# Patient Record
Sex: FEMALE | Race: WHITE | NOT HISPANIC OR LATINO | Employment: OTHER | URBAN - METROPOLITAN AREA
[De-identification: names, ages, dates, MRNs, and addresses within clinical notes are randomized per-mention and may not be internally consistent; named-entity substitution may affect disease eponyms.]

---

## 2018-07-05 ENCOUNTER — OFFICE VISIT (OUTPATIENT)
Dept: UROLOGY | Facility: CLINIC | Age: 78
End: 2018-07-05
Attending: OBSTETRICS & GYNECOLOGY
Payer: MEDICARE

## 2018-07-05 VITALS
SYSTOLIC BLOOD PRESSURE: 154 MMHG | HEART RATE: 74 BPM | WEIGHT: 134.3 LBS | DIASTOLIC BLOOD PRESSURE: 80 MMHG | BODY MASS INDEX: 22.01 KG/M2

## 2018-07-05 DIAGNOSIS — N81.6 RECTOCELE: Primary | ICD-10-CM

## 2018-07-05 PROCEDURE — G0463 HOSPITAL OUTPT CLINIC VISIT: HCPCS | Mod: ZF

## 2018-07-05 RX ORDER — ESTRADIOL 0.1 MG/G
1 CREAM VAGINAL
Qty: 42.5 G | Refills: 3 | Status: SHIPPED | OUTPATIENT
Start: 2018-07-05

## 2018-07-05 RX ORDER — LORATADINE 10 MG/1
10 CAPSULE, LIQUID FILLED ORAL PRN
COMMUNITY

## 2018-07-05 RX ORDER — FOLIC ACID 1 MG/1
1 TABLET ORAL DAILY
Refills: 3 | COMMUNITY
Start: 2017-10-02

## 2018-07-05 RX ORDER — LANOLIN ALCOHOL/MO/W.PET/CERES
1000 CREAM (GRAM) TOPICAL DAILY
COMMUNITY
Start: 2016-09-01

## 2018-07-05 RX ORDER — DIPHENOXYLATE HYDROCHLORIDE AND ATROPINE SULFATE 2.5; .025 MG/1; MG/1
1 TABLET ORAL DAILY
COMMUNITY

## 2018-07-05 RX ORDER — FERROUS SULFATE 325(65) MG
325 TABLET, DELAYED RELEASE (ENTERIC COATED) ORAL DAILY
COMMUNITY
Start: 2017-03-23

## 2018-07-05 RX ORDER — PILOCARPINE HYDROCHLORIDE 5 MG/1
5 TABLET, FILM COATED ORAL 2 TIMES DAILY
COMMUNITY

## 2018-07-05 ASSESSMENT — PAIN SCALES - GENERAL: PAINLEVEL: SEVERE PAIN (7)

## 2018-07-05 NOTE — LETTER
"7/5/2018     RE: Alejandra Ely  [address]  [place] [address]     Dear Colleague,    Thank you for referring your patient, Alejandra Ely, to the [facility] at [facility]. Please see a copy of my visit note below.    July 5, 2018    Referring Provider: Alma Brewer  [facility]  [address] [place] [address]    Primary Care Provider: Gaviota Whittaker    CC: Vaginal Bulge & Pelvic Pressure    HPI:  Alejandra Ely is a 77 year old female w/ PMH of chronic constipation, Lupus, Sjogren's, and recently treated UTIs who presents for evaluation of her pelvic floor symptoms. She was referred to the clinic by Alma Brewer NP after being diagnosed with a rectocele. Patient reports a long history of her urinary symptoms, constipation, and vaginal bulge but is uncertain when they first started as she \"doesn't pay much attention to it.\" In regards to her urinary symptoms, she reports having urge incontinence she often dribbles in her pants (a minipad's worth) before making it to the restroom. She's also had a few episodes where she completely wetted herself from her inability to cease urination. She also endorses urinary retention by reporting the urge to urinate post voiding. She's had increasing nocturia and urinary frequency over the past year. Currently, she urinates 4 times per night. She denies leakage with cough/sneezes. She has urinary hesitancy but reports it seems to be improving in the past two weeks. She is uncertain of hematuria.     In regards to her vaginal bulge, she reports that the bulge was previously \"hanging out\" when seen in the ED on 6/24/18. However, it has since regressed back into her vagina. It still bothers her as she can feel the bulge when she's walking, standing, or sitting. The bulge often requires stenting in order for her to defecate. Her constipation has improved since increasing her Miralax dosage from 6/24/18. "       Prolapse:  Do you feel a vaginal bulge? Yes                                      Pressure? Yes with pelvic pressure when sitting   Do you have to place your fingers in the vagina or in the rectum to have a bowel movement? Yes  Impact to quality of life? Severe     Stress Incontinence:  Do you leak urine with cough, sneeze, exercise? No    Urge Incontinence:  Do you often get sudden urges to urinate? No  If so, do you leak with these urges? Yes  How much do you usually leak? Varies, drops to soak  Impact to quality of life?     Voids/day:4-6  Nocturia: 4  Fluid intake: 6 (8 oz)   Caffeine: 1 (8 oz)     Urinating:  Difficulty starting urination or strain to void? Yes  Weak or intermittent stream? Uncertain  Incomplete emptying or dribbling? Yes  Pain or burning with urination? No  Any blood in your urine? Uncertain, can't see    GI:  Constipation? Yes  Frequency stools: every other day  Straining for stools Yes, improving with miralax increased  Stool consistency normal  Ever leak stool (Accidental Bowel Leakage)? No  Need to push the vaginal bulge inside with fingers for defecation    Sexual/Pain:  Are you currently having sex?. No    Prior therapy:  Ever done pelvic floor physical therapy? No  Trial of medication? No  Have you ever tried a pessary? No    Medical History:  Do you have?   High Cholesterol? No  Diabetes? No  High Blood pressure? No   Recurrent UTIs? No  Sleep Apnea? No  Other medical problems: see below    Surgical History:    Hysterectomy? No   Bladder Surgery? No   Other? See below    OB/Gyn History:  Pregnancies? 4  Deliveries? 4  Vaginal 4  Section 0      Medications/Vitamins/Supplements: see below    Drug Allergies: see below    Latex Allergy: No  Iodine Allergy No      Social History:  Marital status:   Do you/ have you ever smoke(d)  cigarettes? No  Drink more than 1 alcoholic beverage a day?  No  Occupation? retired    In the past 3 months have you regularly  experienced:  Chest pain w/ walking/exercise? No                Unusual headaches? No  Leg pain w/ walking/exercise? No                    Easy bruising? No  Difficulty breathing w/ walking/exercise? no  Problems with vision? Yes  Dizziness, falls, or fainting? Yes  Excessive bleeding from cuts, gums, surgery? no  Other:     Past Medical History:   Diagnosis Date     AAA (abdominal aortic aneurysm) (H)      Aortic insufficiency      Aortic stenosis      Barretts esophagus 5/09    EGD - Oozing S/P Cautery     Blood loss anemia      Blood transfusion      Cataract     Bilateral     Coronary artery disease      GERD (gastroesophageal reflux disease)      Macular degeneration disease      Osteopenia 2009     Overflow incontinence      Shortness of breath      Sjogren syndrome      Steroid long-term use        Past Surgical History:   Procedure Laterality Date     C APPENDECTOMY       D & C  2002     EYE SURGERY      bilat     REPLACE VALVE AORTIC  1/31/2012    Procedure:REPLACE VALVE AORTIC; Median Sternotomy, Aortic Valve Replacement;, Intraoperative Transesophageal Echocardiogram, On Pump Oxygenator; Surgeon:CARLTON YANG; Location:[facility]     TONSILLECTOMY         Social History     Social History     Marital status:      Spouse name: N/A     Number of children: N/A     Years of education: N/A     Occupational History     Not on file.     Social History Main Topics     Smoking status: Never Smoker     Smokeless tobacco: Never Used     Alcohol use No      Comment: 1 - 2 times per month     Drug use: No     Sexual activity: Yes     Partners: Male     Other Topics Concern      Service No     Blood Transfusions Yes     Caffeine Concern No     Occupational Exposure No     Hobby Hazards No     Sleep Concern No     Stress Concern No     Weight Concern Yes     Special Diet No     Back Care No     Exercise Yes     Bike Helmet No     Seat Belt Yes     Self-Exams No     Social History Narrative     No  "narrative on file       Family History   Problem Relation Age of Onset     Respiratory Father      emphysema     HEART DISEASE Father      CHF     Hypertension Father      Asthma Son      Cancer - colorectal Brother      april 2010       ROS    Allergies   Allergen Reactions     Dye [Contrast Dye]      headaches     Aspirin      Sensitivity, hx of GI bleeding while on IBU     Clindamycin      \"black stools\", hx of GI bleed while on IBU and Clindamycin     Ibuprofen      Sensitivity, hx of GI bleeding while taking IBU     Levaquin      Sore ankle, tendon     Penicillins Hives     Tramadol Nausea       Current Outpatient Prescriptions   Medication     acetaminophen (TYLENOL) 325 MG tablet     AVASTIN 100 MG/4ML IV SOLN     Calcium (CALCIUM-CARB 600) 600 MG tablet     fish oil-omega-3 fatty acids 1000 MG capsule     ketotifen (ZADITOR) 0.025 % SOLN     MAGNESIUM PO     omeprazole (PRILOSEC) 40 MG capsule     polyethylene glycol (MIRALAX) powder     polyethylene glycol - propylene glycol (SYSTANE) 0.4-0.3 % SOLN     PredniSONE 2 MG TBEC     PRESERVISION AREDS PO     White Petrolatum-Mineral Oil (SYSTANE NIGHTTIME) OINT     No current facility-administered medications for this visit.        /80  Pulse 74  Wt 60.9 kg (134 lb 4.8 oz)  BMI 22.01 kg/m2 No LMP recorded. Patient is postmenopausal. Body mass index is 22.01 kg/(m^2).  Patient is alert, comfortable in no acute distress, non-labored breathing.   Abdomen is soft, non-tender, non-distended, no CVAT.    Atrophic external female genitalia. The urethra was covered by atrophic labia minora.    She has a rectocele to the HR, but otherwise good support on supine strain.  Speculum and bimanual exam are remarkable for significant vaginal atrophy.        POPQ  Aa -3   Ba -3  C -8  D -10  GH 2  PB 3  TVL 10  Ap -1  Bp -1    NEGATIVE SST  No spontaneous VOID; Bladder scan indicated 248mL  Straight catheter volume: 180mL  Post-straight catheter: 68mL left    A/P: Alejandra " GODFREY Ely, 77 year old F with recent hx of recovered UTI and chronic urge incontinence is here for rectocele evaluation and treatment.    We discussed the diagnosis and treatment options. Pt has stage 1 rectocele. Treatment options to include:  1) observation with f/u in 6 -12 months  2) pessary fitting after topical vaginal estrogen treatment  3) surgical management    Will attempt to treat vaginal atrophy with estrogen cream. Will bring pt back for pessary fitting after 1 month of vaginal estrogen.    A total of 60 minutes were spent with the patient today, > 50% in counseling and coordination of care       Lacey Rahman MD  N OBGYN PGY-1  7/5/2018    CC  Patient Care Team:  Gaviota Whittaker MD as PCP - General  Alma Keating as Referring Physician  Kyle Sandoval MD as MD (OB/Gyn)  ALMA KEATING    I was present for the history and physical and agree with the findings and treatment plan outlined above.     Kyle Sandoval MD  Professor, OB/GYN  Urogynecologist

## 2018-07-05 NOTE — NURSING NOTE
Patient tested negative for urianry stress incontinence  Unable to void-  Bladder scan  Showed 240 ml residual urine-simple catheter was performed and 180 ml was obtained.

## 2018-07-05 NOTE — PROGRESS NOTES
"July 5, 2018    Referring Provider: Alma Brewer  [facility]  [address] [place] [address]    Primary Care Provider: Gaviota Whittaker    CC: Vaginal Bulge & Pelvic Pressure    HPI:  Alejandra Ely is a 77 year old female w/ PMH of chronic constipation, Lupus, Sjogren's, and recently treated UTIs who presents for evaluation of her pelvic floor symptoms. She was referred to the clinic by Alma Brewer NP after being diagnosed with a rectocele. Patient reports a long history of her urinary symptoms, constipation, and vaginal bulge but is uncertain when they first started as she \"doesn't pay much attention to it.\" In regards to her urinary symptoms, she reports having urge incontinence she often dribbles in her pants (a minipad's worth) before making it to the restroom. She's also had a few episodes where she completely wetted herself from her inability to cease urination. She also endorses urinary retention by reporting the urge to urinate post voiding. She's had increasing nocturia and urinary frequency over the past year. Currently, she urinates 4 times per night. She denies leakage with cough/sneezes. She has urinary hesitancy but reports it seems to be improving in the past two weeks. She is uncertain of hematuria.     In regards to her vaginal bulge, she reports that the bulge was previously \"hanging out\" when seen in the ED on 6/24/18. However, it has since regressed back into her vagina. It still bothers her as she can feel the bulge when she's walking, standing, or sitting. The bulge often requires stenting in order for her to defecate. Her constipation has improved since increasing her Miralax dosage from 6/24/18.       Prolapse:  Do you feel a vaginal bulge? Yes                                      Pressure? Yes with pelvic pressure when sitting   Do you have to place your fingers in the vagina or in the rectum to have a bowel movement? Yes  Impact to quality of life? Severe     Stress " Incontinence:  Do you leak urine with cough, sneeze, exercise? No    Urge Incontinence:  Do you often get sudden urges to urinate? No  If so, do you leak with these urges? Yes  How much do you usually leak? Varies, drops to soak  Impact to quality of life?     Voids/day:4-6  Nocturia: 4  Fluid intake: 6 (8 oz)   Caffeine: 1 (8 oz)     Urinating:  Difficulty starting urination or strain to void? Yes  Weak or intermittent stream? Uncertain  Incomplete emptying or dribbling? Yes  Pain or burning with urination? No  Any blood in your urine? Uncertain, can't see    GI:  Constipation? Yes  Frequency stools: every other day  Straining for stools Yes, improving with miralax increased  Stool consistency normal  Ever leak stool (Accidental Bowel Leakage)? No  Need to push the vaginal bulge inside with fingers for defecation    Sexual/Pain:  Are you currently having sex?. No    Prior therapy:  Ever done pelvic floor physical therapy? No  Trial of medication? No  Have you ever tried a pessary? No    Medical History:  Do you have?   High Cholesterol? No  Diabetes? No  High Blood pressure? No   Recurrent UTIs? No  Sleep Apnea? No  Other medical problems: see below    Surgical History:    Hysterectomy? No   Bladder Surgery? No   Other? See below    OB/Gyn History:  Pregnancies? 4  Deliveries? 4  Vaginal 4  Section 0      Medications/Vitamins/Supplements: see below    Drug Allergies: see below    Latex Allergy: No  Iodine Allergy No      Social History:  Marital status:   Do you/ have you ever smoke(d)  cigarettes? No  Drink more than 1 alcoholic beverage a day?  No  Occupation? retired    In the past 3 months have you regularly experienced:  Chest pain w/ walking/exercise? No                Unusual headaches? No  Leg pain w/ walking/exercise? No                    Easy bruising? No  Difficulty breathing w/ walking/exercise? no  Problems with vision? Yes  Dizziness, falls, or fainting? Yes  Excessive bleeding from  cuts, gums, surgery? no  Other:     Past Medical History:   Diagnosis Date     AAA (abdominal aortic aneurysm) (H)      Aortic insufficiency      Aortic stenosis      Barretts esophagus 5/09    EGD - Oozing S/P Cautery     Blood loss anemia      Blood transfusion      Cataract     Bilateral     Coronary artery disease      GERD (gastroesophageal reflux disease)      Macular degeneration disease      Osteopenia 2009     Overflow incontinence      Shortness of breath      Sjogren syndrome      Steroid long-term use        Past Surgical History:   Procedure Laterality Date     C APPENDECTOMY       D & C  2002     EYE SURGERY      bilat     REPLACE VALVE AORTIC  1/31/2012    Procedure:REPLACE VALVE AORTIC; Median Sternotomy, Aortic Valve Replacement;, Intraoperative Transesophageal Echocardiogram, On Pump Oxygenator; Surgeon:CARLTON YANG; Location:[facility]     TONSILLECTOMY         Social History     Social History     Marital status:      Spouse name: N/A     Number of children: N/A     Years of education: N/A     Occupational History     Not on file.     Social History Main Topics     Smoking status: Never Smoker     Smokeless tobacco: Never Used     Alcohol use No      Comment: 1 - 2 times per month     Drug use: No     Sexual activity: Yes     Partners: Male     Other Topics Concern      Service No     Blood Transfusions Yes     Caffeine Concern No     Occupational Exposure No     Hobby Hazards No     Sleep Concern No     Stress Concern No     Weight Concern Yes     Special Diet No     Back Care No     Exercise Yes     Bike Helmet No     Seat Belt Yes     Self-Exams No     Social History Narrative     No narrative on file       Family History   Problem Relation Age of Onset     Respiratory Father      emphysema     HEART DISEASE Father      CHF     Hypertension Father      Asthma Son      Cancer - colorectal Brother      april 2010       ROS    Allergies   Allergen Reactions     Dye [Contrast  "Dye]      headaches     Aspirin      Sensitivity, hx of GI bleeding while on IBU     Clindamycin      \"black stools\", hx of GI bleed while on IBU and Clindamycin     Ibuprofen      Sensitivity, hx of GI bleeding while taking IBU     Levaquin      Sore ankle, tendon     Penicillins Hives     Tramadol Nausea       Current Outpatient Prescriptions   Medication     acetaminophen (TYLENOL) 325 MG tablet     AVASTIN 100 MG/4ML IV SOLN     Calcium (CALCIUM-CARB 600) 600 MG tablet     fish oil-omega-3 fatty acids 1000 MG capsule     ketotifen (ZADITOR) 0.025 % SOLN     MAGNESIUM PO     omeprazole (PRILOSEC) 40 MG capsule     polyethylene glycol (MIRALAX) powder     polyethylene glycol - propylene glycol (SYSTANE) 0.4-0.3 % SOLN     PredniSONE 2 MG TBEC     PRESERVISION AREDS PO     White Petrolatum-Mineral Oil (SYSTANE NIGHTTIME) OINT     No current facility-administered medications for this visit.        /80  Pulse 74  Wt 60.9 kg (134 lb 4.8 oz)  BMI 22.01 kg/m2 No LMP recorded. Patient is postmenopausal. Body mass index is 22.01 kg/(m^2).  Patient is alert, comfortable in no acute distress, non-labored breathing.   Abdomen is soft, non-tender, non-distended, no CVAT.    Atrophic external female genitalia. The urethra was covered by atrophic labia minora.    She has a rectocele to the HR, but otherwise good support on supine strain.  Speculum and bimanual exam are remarkable for significant vaginal atrophy.        POPQ  Aa -3   Ba -3  C -8  D -10  GH 2  PB 3  TVL 10  Ap -1  Bp -1    NEGATIVE SST  No spontaneous VOID; Bladder scan indicated 248mL  Straight catheter volume: 180mL  Post-straight catheter: 68mL left    A/P: Alejandra Ely, 77 year old F with recent hx of recovered UTI and chronic urge incontinence is here for rectocele evaluation and treatment.    We discussed the diagnosis and treatment options. Pt has stage 1 rectocele. Treatment options to include:  1) observation with f/u in 6 -12 months  2) pessary " fitting after topical vaginal estrogen treatment  3) surgical management    Will attempt to treat vaginal atrophy with estrogen cream. Will bring pt back for pessary fitting after 1 month of vaginal estrogen.    A total of 60 minutes were spent with the patient today, > 50% in counseling and coordination of care       Lacey Rahman MD  N OBGYN PGY-1  7/5/2018        CC  Patient Care Team:  Gaviota Whittaker MD as PCP - Alma Shelton as Referring Physician  Kyle Sandoval MD as MD (OB/Gyn)  ALMA KEATING    I was present for the history and physical and agree with the findings and treatment plan outlined above.     Kyle Sandoval MD  Professor, OB/GYN  Urogynecologist

## 2018-07-05 NOTE — MR AVS SNAPSHOT
After Visit Summary   7/5/2018    Alejandra Ely    MRN: [id number]           Patient Information     Date Of Birth          1940        Visit Information        Provider Department      7/5/2018 3:20 PM Kyle Sandoval MD [facility]         Follow-ups after your visit        Who to contact     Please call your clinic at [phone] to:    Ask questions about your health    Make or cancel appointments    Discuss your medicines    Learn about your test results    Speak to your doctor            Additional Information About Your Visit        MyChart Information     [employer] gives you secure access to your electronic health record. If you see a primary care provider, you can also send messages to your care team and make appointments. If you have questions, please call your primary care clinic.  If you do not have a primary care provider, please call [phone] and they will assist you.      [employer] is an electronic gateway that provides easy, online access to your medical records. With [employer], you can request a clinic appointment, read your test results, renew a prescription or communicate with your care team.     To access your existing account, please contact your [facility] or call [phone] for assistance.        Care EveryWhere ID     This is your Care EveryWhere ID. This could be used by other organizations to access your [place] medical records  [id number]        Your Vitals Were     Pulse BMI (Body Mass Index)                74 22.01 kg/m2           Blood Pressure from Last 3 Encounters:   07/05/18 154/80   06/18/13 117/73   06/18/13 132/73    Weight from Last 3 Encounters:   07/05/18 60.9 kg (134 lb 4.8 oz)   06/18/13 64 kg (141 lb 3.2 oz)   06/12/13 64.3 kg (141 lb 12.8 oz)              Today, you had the following     No orders found for display         Today's Medication Changes          These changes are accurate as of 7/5/18   4:30 PM.  If you have any questions, ask your nurse or doctor.               These medicines have changed or have updated prescriptions.        Dose/Directions    omeprazole 40 MG capsule   Commonly known as:  priLOSEC   This may have changed:  how much to take   Used for:  GERD (gastroesophageal reflux disease)        Dose:  40 mg   Take 1 capsule by mouth daily.   Quantity:  90 capsule   Refills:  1         Stop taking these medicines if you haven't already. Please contact your care team if you have questions.     fish oil-omega-3 fatty acids 1000 MG capsule   Stopped by:  Kyle Sandoval MD           MAGNESIUM PO   Stopped by:  Kyle Sandoval MD           predniSONE 2 MG EC tablet   Commonly known as:  ALLA   Stopped by:  Kyle Sandoval MD           SYSTANE NIGHTTIME Oint   Stopped by:  Kyle Sandoval MD           ZADITOR 0.025 % Soln ophthalmic solution   Generic drug:  ketotifen   Stopped by:  Kyle Sandoval MD                    Primary Care Provider Office Phone # Fax #    Gaviota Whittaker [phone]       XXX NO INFO FOUND  [facility] [place] [address]        Equal Access to Services     [facility]: Hadii aad ku hadasho Soomaali, waaxda luqadaha, qaybta kaalmada adeegyada, waxay eboniin haypepe yeager . So [facility] [phone].    ATENCIÓN: Si habla español, tiene a leonard disposición servicios gratuitos de asistencia lingüística. Llame al [phone].    We comply with applicable federal civil rights laws and [place] laws. We do not discriminate on the basis of race, color, national origin, age, disability, sex, sexual orientation, or gender identity.            Thank you!     Thank you for choosing [facility]  for your care. Our goal is always to provide you with excellent care. Hearing back from our patients is one way we can continue to improve our services. Please take a few minutes to complete the written survey that you  may receive in the mail after your visit with us. Thank you!             Your Updated Medication List - Protect others around you: Learn how to safely use, store and throw away your medicines at www.disposemymeds.org.          This list is accurate as of 7/5/18  4:30 PM.  Always use your most recent med list.                   Brand Name Dispense Instructions for use Diagnosis    AVASTIN 100 MG/4ML injection   Generic drug:  BEVACizumab      10 MG/KG EVERY 3 months        calcium carbonate 600 MG tablet   Generic drug:  calcium      Take 1 tablet by mouth 2 times daily.        cyanocobalamin 1000 MCG tablet    vitamin  B-12     Take 1,000 mcg by mouth daily        ferrous sulfate 325 (65 Fe) MG Tbec EC tablet      Take 325 mg by mouth daily        folic acid 1 MG tablet    FOLVITE     1 tablet daily        loratadine 10 MG capsule      10 mg as needed        methotrexate 2.5 MG tablet CHEMO      17.5 mg once a week        MULTI-VITAMINS Tabs      1 tablet daily        omeprazole 40 MG capsule    priLOSEC    90 capsule    Take 1 capsule by mouth daily.    GERD (gastroesophageal reflux disease)       pilocarpine 5 MG tablet    SALAGEN     5 mg 2 times daily        polyethylene glycol 0.4%- propylene glycol 0.3% 0.4-0.3 % Soln ophthalmic solution    SYSTANE ULTRA     Place 1 drop into both eyes every hour as needed.        polyethylene glycol powder    MIRALAX    510 g    Take 17 g by mouth daily.    Constipation       PRESERVISION AREDS PO      Take 1 tab by mouth. Once daily.        TYLENOL 325 MG tablet   Generic drug:  acetaminophen      Take 1-2 tablets by mouth every 6 hours as needed.

## 2018-08-02 ENCOUNTER — OFFICE VISIT (OUTPATIENT)
Dept: UROLOGY | Facility: CLINIC | Age: 78
End: 2018-08-02
Payer: COMMERCIAL

## 2018-08-02 VITALS
DIASTOLIC BLOOD PRESSURE: 88 MMHG | SYSTOLIC BLOOD PRESSURE: 171 MMHG | HEART RATE: 72 BPM | WEIGHT: 130.9 LBS | HEIGHT: 63 IN | BODY MASS INDEX: 23.2 KG/M2

## 2018-08-02 DIAGNOSIS — N81.4 UTEROVAGINAL PROLAPSE: Primary | ICD-10-CM

## 2018-08-02 DIAGNOSIS — N95.2 ATROPHIC VAGINITIS: ICD-10-CM

## 2018-08-02 RX ORDER — NITROFURANTOIN 25; 75 MG/1; MG/1
CAPSULE ORAL
COMMUNITY
Start: 2018-03-05 | End: 2018-08-02

## 2018-08-02 RX ORDER — LATANOPROST 50 UG/ML
1 SOLUTION/ DROPS OPHTHALMIC
COMMUNITY

## 2018-08-02 ASSESSMENT — PAIN SCALES - GENERAL: PAINLEVEL: NO PAIN (0)

## 2018-08-02 NOTE — LETTER
8/2/2018       RE: Alejandra Ely  [id number] [address] [address]     Dear Colleague,    Thank you for referring your patient, Alejandra Ely, to the [facility] UROLOGY AND INST FOR PROSTATE AND UROLOGIC CANCERS at [facility]. Please see a copy of my visit note below.    August 2, 2018    New Patient Visit    Patient comes today for fitting for pessary due to stage I rectocele found on previous exam 7/5/18 with Dr. Sandoval. She was supposed to have seen Dr Sandoval in follow up but secondary to a scheduling issue her appointment today had been reschedules but she was not aware.    At her last visit with Dr Sandoval she was started on topical vaginal estrogen cream to treat vaginal atrophy and was asked to return for pessary fitting.  She has not tried any other treatments and her daughter is actually a pelvic floor physical therapist at [facility].  She denies any changes in her health since last visit.    Past Medical History:   Diagnosis Date     AAA (abdominal aortic aneurysm) (H)      Aortic insufficiency      Aortic stenosis      Barretts esophagus 5/09    EGD - Oozing S/P Cautery     Blood loss anemia      Blood transfusion      Cataract     Bilateral     Coronary artery disease      GERD (gastroesophageal reflux disease)      Macular degeneration disease      Osteopenia 2009     Overflow incontinence      Seasonal allergies     pine trees     Shortness of breath      Sjogren syndrome      Steroid long-term use      Past Surgical History:   Procedure Laterality Date     C APPENDECTOMY       D & C  2002     EYE SURGERY      bilat     hemmorroidectomy       pace maker       REPLACE VALVE AORTIC  1/31/2012    Procedure:REPLACE VALVE AORTIC; Median Sternotomy, Aortic Valve Replacement;, Intraoperative Transesophageal Echocardiogram, On Pump Oxygenator; Surgeon:CARLTON YANG; Location:[facility] OR     TONSILLECTOMY       Family History   Problem Relation Age of Onset     Respiratory  Father      emphysema     HEART DISEASE Father      CHF     Hypertension Father      Asthma Son      Cancer - colorectal Brother      april 2010     Breast Cancer Mother      Colon Cancer Paternal Aunt      Social History     Social History     Marital status:      Spouse name: N/A     Number of children: N/A     Years of education: N/A     Occupational History     Not on file.     Social History Main Topics     Smoking status: Never Smoker     Smokeless tobacco: Never Used     Alcohol use No      Comment: 1 - 2 times per month     Drug use: No     Sexual activity: Yes     Partners: Male     Other Topics Concern      Service No     Blood Transfusions Yes     Caffeine Concern No     Occupational Exposure No     Hobby Hazards No     Sleep Concern No     Stress Concern No     Weight Concern Yes     Special Diet No     Back Care No     Exercise Yes     Bike Helmet No     Seat Belt Yes     Self-Exams No     Social History Narrative       Current Outpatient Prescriptions:      acetaminophen (TYLENOL) 325 MG tablet, Take 1-2 tablets by mouth every 6 hours as needed., Disp: , Rfl:      AVASTIN 100 MG/4ML IV SOLN, 10 MG/KG EVERY 3 months, Disp: , Rfl:      Calcium (CALCIUM-CARB 600) 600 MG tablet, Take 1 tablet by mouth 2 times daily., Disp: , Rfl:      calcium carbonate-vitamin D 600-200 MG-UNIT TABS, , Disp: , Rfl:      cyanocobalamin (VITAMIN  B-12) 1000 MCG tablet, Take 1,000 mcg by mouth daily, Disp: , Rfl:      estradiol (ESTRACE VAGINAL) 0.1 MG/GM cream, Place 1 g vaginally twice a week, Disp: 42.5 g, Rfl: 3     ferrous sulfate 325 (65 Fe) MG TBEC EC tablet, Take 325 mg by mouth daily, Disp: , Rfl:      folic acid (FOLVITE) 1 MG tablet, 1 tablet daily, Disp: , Rfl: 3     latanoprost (XALATAN) 0.005 % ophthalmic solution, 1 drop, Disp: , Rfl:      loratadine 10 MG capsule, 10 mg as needed, Disp: , Rfl:      methotrexate 2.5 MG tablet CHEMO, 17.5 mg once a week, Disp: , Rfl:      Methylcobalamin 1 MG  "CHEW, , Disp: , Rfl:      Multiple Vitamin (MULTI-VITAMINS) TABS, 1 tablet daily, Disp: , Rfl:      omeprazole (PRILOSEC) 40 MG capsule, Take 1 capsule by mouth daily. (Patient taking differently: Take 20 mg by mouth daily ), Disp: 90 capsule, Rfl: 1     pilocarpine (SALAGEN) 5 MG tablet, 5 mg 2 times daily, Disp: , Rfl:      polyethylene glycol (MIRALAX) powder, Take 17 g by mouth daily., Disp: 510 g, Rfl: 3     polyethylene glycol - propylene glycol (SYSTANE) 0.4-0.3 % SOLN, Place 1 drop into both eyes every hour as needed., Disp: , Rfl:      PRESERVISION AREDS PO, Take 1 tab by mouth. Once daily., Disp: , Rfl:      Allergies   Allergen Reactions     Dye [Contrast Dye]      headaches     Apap-Fd&C Yellow #10 [place]-Hydrocodone      Vomiting     Aspirin      Sensitivity, hx of GI bleeding while on IBU     Aspirin Unknown     Clindamycin      \"black stools\", hx of GI bleed while on IBU and Clindamycin     Ibuprofen      Sensitivity, hx of GI bleeding while taking IBU     Levaquin      Sore ankle, tendon     Levofloxacin Swelling     Pain and swelling left foot     No Clinical Screening - See Comments Other (See Comments)     2-in-1 cleanser     Penicillins Hives and Unknown     Tramadol Nausea     Hydrochlorothiazide Rash     Leflunomide Rash     /88  Pulse 72  Ht 1.6 m (5' 3\")  Wt 59.4 kg (130 lb 14.4 oz)  BMI 23.19 kg/m2  She is comfortable, in no distress, non-labored breathing.  Abdomen is soft, non-tender, non-distended.  Normal external female genitalia.  Speculum and bimanual exam are remarkable for atrophic vaginal tissues. Pelvic floor is tender to light palpation. Stage II prolapse with posterior wall at -1.     She was fitted with a #2 ring with support that was too large and caused a small tear in the posterior introitus that required some silver nitrate for hemostasis.  A #1 ring with support however was easier to place and patient tolerated it.    A/P: 77 year old F with stage II " uterovaginal prolapse and vaginal atrophy.     At this time discussed with patient leaving the pessary in place and having her return to see Dr Sandoval in 2-4 weeks for follow up to assess how well the pessary is working.      Instructed her on proper use of the vaginal estrogen cream    Discussed that she is a good candidate for pelvic floor therapy and encouraged her to talk to her daughter more about this.      She will follow up with Dr Sandoval and return to see us as needed    Scribed by Kevin Guerin, MS4, [employer], for Ellyn Witt MD, MPH.    30 minutes were spent with the patient today, > 50% in counseling and coordination of care    Ellyn Witt MD MPH   of Urology    CC  Patient Care Team:  Gaviota Whittaker MD as PCP - Alma Shelton as Referring Physician  Kyle Sandoval MD as MD (OB/Gyn)

## 2018-08-02 NOTE — MR AVS SNAPSHOT
After Visit Summary   8/2/2018    Alejandra Ely    MRN: [id number]           Patient Information     Date Of Birth          1940        Visit Information        Provider Department      8/2/2018 3:45 PM Ellyn Witt MD [facility] Urology and [facility] for Prostate and Urologic Cancers        Today's Diagnoses     Uterovaginal prolapse    -  1    Atrophic vaginitis          Care Instructions    If you pessary falls out don't replace it    Consider pelvic floor physical therapy    Websites with free information:    American Urogynecologic Society patient website: www.voicesforpfd.org    Total Control Program: www.totalcontrolprogram.com    Return to see Dr Ortiz in one month, sooner if needed    It was a pleasure meeting with you today.  Thank you for allowing me and my team the privilege of caring for you today.  YOU are the reason we are here, and I truly hope we provided you with the excellent service you deserve.  Please let us know if there is anything else we can do for you so that we can be sure you are leaving completely satisfied with your care experience.                Follow-ups after your visit        Your next 10 appointments already scheduled     Aug 15, 2018  1:00 PM CDT   Return Urogyn with Kyle Sandoval MD   [facility] ([facility])    [facility]  [address], [facility] [address]  [facility] [address] [phone]           Please call [facility] , [phone], with any questions or concerns you may have regarding your appointment              Who to contact     Please call your clinic at [phone] to:    Ask questions about your health    Make or cancel appointments    Discuss your medicines    Learn about your test results    Speak to your doctor            Additional Information About Your Visit        MyChart Information     [employer] gives you secure access to your electronic health record. If you  "see a primary care provider, you can also send messages to your care team and make appointments. If you have questions, please call your primary care clinic.  If you do not have a primary care provider, please call [phone] and they will assist you.      cheerapp is an electronic gateway that provides easy, online access to your medical records. With cheerapp, you can request a clinic appointment, read your test results, renew a prescription or communicate with your care team.     To access your existing account, please contact your [facility] or call [phone] for assistance.        Care EveryWhere ID     This is your Care EveryWhere ID. This could be used by other organizations to access your [facility] medical records  [id number]        Your Vitals Were     Pulse Height BMI (Body Mass Index)             72 1.6 m (5' 3\") 23.19 kg/m2          Blood Pressure from Last 3 Encounters:   08/02/18 171/88   07/05/18 154/80   06/18/13 117/73    Weight from Last 3 Encounters:   08/02/18 59.4 kg (130 lb 14.4 oz)   07/05/18 60.9 kg (134 lb 4.8 oz)   06/18/13 64 kg (141 lb 3.2 oz)              We Performed the Following     Fit/Insert Intravaginal Support Device/Pessary (28655)     PESSARY, NON RUBBER,ANY TYPE          Today's Medication Changes          These changes are accurate as of 8/2/18  5:22 PM.  If you have any questions, ask your nurse or doctor.               These medicines have changed or have updated prescriptions.        Dose/Directions    omeprazole 40 MG capsule   Commonly known as:  priLOSEC   This may have changed:  how much to take   Used for:  GERD (gastroesophageal reflux disease)        Dose:  40 mg   Take 1 capsule by mouth daily.   Quantity:  90 capsule   Refills:  1                Primary Care Provider Office Phone # Fax #    Gaviota Whittaker [phone]       XXX NO INFO FOUND  [employer] [address] [id number]        Equal Access to " Services     [facility]: Hadii loraine blackwood carlinlucy Vinnyali, wajadda luqadaha, qaybta kaalmada charlie, phylicia yeager . So [facility] [phone].    ATENCIÓN: Si cindy calvo, tiene a leonard disposición servicios gratuitos de asistencia lingüística. Llame al [phone].    We comply with applicable federal civil rights laws and [place] laws. We do not discriminate on the basis of race, color, national origin, age, disability, sex, sexual orientation, or gender identity.            Thank you!     Thank you for choosing [facility] UROLOGY AND [facility] FOR PROSTATE AND UROLOGIC CANCERS  for your care. Our goal is always to provide you with excellent care. Hearing back from our patients is one way we can continue to improve our services. Please take a few minutes to complete the written survey that you may receive in the mail after your visit with us. Thank you!             Your Updated Medication List - Protect others around you: Learn how to safely use, store and throw away your medicines at www.disposemymeds.org.          This list is accurate as of 8/2/18  5:22 PM.  Always use your most recent med list.                   Brand Name Dispense Instructions for use Diagnosis    AVASTIN 100 MG/4ML injection   Generic drug:  BEVACizumab      10 MG/KG EVERY 3 months        calcium carbonate 600 MG tablet   Generic drug:  calcium      Take 1 tablet by mouth 2 times daily.        calcium carbonate-vitamin D 600-200 MG-UNIT Tabs           cyanocobalamin 1000 MCG tablet    vitamin  B-12     Take 1,000 mcg by mouth daily        estradiol 0.1 MG/GM cream    ESTRACE VAGINAL    42.5 g    Place 1 g vaginally twice a week    Rectocele       ferrous sulfate 325 (65 Fe) MG Tbec EC tablet      Take 325 mg by mouth daily        folic acid 1 MG tablet    FOLVITE     1 tablet daily        latanoprost 0.005 % ophthalmic solution    XALATAN     1 drop        loratadine 10 MG capsule      10 mg as needed        methotrexate  2.5 MG tablet CHEMO      17.5 mg once a week        Methylcobalamin 1 MG Chew           MULTI-VITAMINS Tabs      1 tablet daily        omeprazole 40 MG capsule    priLOSEC    90 capsule    Take 1 capsule by mouth daily.    GERD (gastroesophageal reflux disease)       pilocarpine 5 MG tablet    SALAGEN     5 mg 2 times daily        polyethylene glycol 0.4%- propylene glycol 0.3% 0.4-0.3 % Soln ophthalmic solution    SYSTANE ULTRA     Place 1 drop into both eyes every hour as needed.        polyethylene glycol powder    MIRALAX    510 g    Take 17 g by mouth daily.    Constipation       PRESERVISION AREDS PO      Take 1 tab by mouth. Once daily.        TYLENOL 325 MG tablet   Generic drug:  acetaminophen      Take 1-2 tablets by mouth every 6 hours as needed.

## 2018-08-02 NOTE — PROGRESS NOTES
August 2, 2018    New Patient Visit    Patient comes today for fitting for pessary due to stage I rectocele found on previous exam 7/5/18 with Dr. Sandoval. She was supposed to have seen Dr Sandoval in follow up but secondary to a scheduling issue her appointment today had been reschedules but she was not aware.    At her last visit with Dr Sandoval she was started on topical vaginal estrogen cream to treat vaginal atrophy and was asked to return for pessary fitting.  She has not tried any other treatments and her daughter is actually a pelvic floor physical therapist at [facility].  She denies any changes in her health since last visit.    Past Medical History:   Diagnosis Date     AAA (abdominal aortic aneurysm) (H)      Aortic insufficiency      Aortic stenosis      Barretts esophagus 5/09    EGD - Oozing S/P Cautery     Blood loss anemia      Blood transfusion      Cataract     Bilateral     Coronary artery disease      GERD (gastroesophageal reflux disease)      Macular degeneration disease      Osteopenia 2009     Overflow incontinence      Seasonal allergies     pine trees     Shortness of breath      Sjogren syndrome      Steroid long-term use      Past Surgical History:   Procedure Laterality Date     C APPENDECTOMY       D & C  2002     EYE SURGERY      bilat     hemmorroidectomy       pace maker       REPLACE VALVE AORTIC  1/31/2012    Procedure:REPLACE VALVE AORTIC; Median Sternotomy, Aortic Valve Replacement;, Intraoperative Transesophageal Echocardiogram, On Pump Oxygenator; Surgeon:CARLTON YANG; Location:[facility]     TONSILLECTOMY       Family History   Problem Relation Age of Onset     Respiratory Father      emphysema     HEART DISEASE Father      CHF     Hypertension Father      Asthma Son      Cancer - colorectal Brother      april 2010     Breast Cancer Mother      Colon Cancer Paternal Aunt      Social History     Social History     Marital status:      Spouse name: N/A     Number of  children: N/A     Years of education: N/A     Occupational History     Not on file.     Social History Main Topics     Smoking status: Never Smoker     Smokeless tobacco: Never Used     Alcohol use No      Comment: 1 - 2 times per month     Drug use: No     Sexual activity: Yes     Partners: Male     Other Topics Concern      Service No     Blood Transfusions Yes     Caffeine Concern No     Occupational Exposure No     Hobby Hazards No     Sleep Concern No     Stress Concern No     Weight Concern Yes     Special Diet No     Back Care No     Exercise Yes     Bike Helmet No     Seat Belt Yes     Self-Exams No     Social History Narrative       Current Outpatient Prescriptions:      acetaminophen (TYLENOL) 325 MG tablet, Take 1-2 tablets by mouth every 6 hours as needed., Disp: , Rfl:      AVASTIN 100 MG/4ML IV SOLN, 10 MG/KG EVERY 3 months, Disp: , Rfl:      Calcium (CALCIUM-CARB 600) 600 MG tablet, Take 1 tablet by mouth 2 times daily., Disp: , Rfl:      calcium carbonate-vitamin D 600-200 MG-UNIT TABS, , Disp: , Rfl:      cyanocobalamin (VITAMIN  B-12) 1000 MCG tablet, Take 1,000 mcg by mouth daily, Disp: , Rfl:      estradiol (ESTRACE VAGINAL) 0.1 MG/GM cream, Place 1 g vaginally twice a week, Disp: 42.5 g, Rfl: 3     ferrous sulfate 325 (65 Fe) MG TBEC EC tablet, Take 325 mg by mouth daily, Disp: , Rfl:      folic acid (FOLVITE) 1 MG tablet, 1 tablet daily, Disp: , Rfl: 3     latanoprost (XALATAN) 0.005 % ophthalmic solution, 1 drop, Disp: , Rfl:      loratadine 10 MG capsule, 10 mg as needed, Disp: , Rfl:      methotrexate 2.5 MG tablet CHEMO, 17.5 mg once a week, Disp: , Rfl:      Methylcobalamin 1 MG CHEW, , Disp: , Rfl:      Multiple Vitamin (MULTI-VITAMINS) TABS, 1 tablet daily, Disp: , Rfl:      omeprazole (PRILOSEC) 40 MG capsule, Take 1 capsule by mouth daily. (Patient taking differently: Take 20 mg by mouth daily ), Disp: 90 capsule, Rfl: 1     pilocarpine (SALAGEN) 5 MG tablet, 5 mg 2 times  "daily, Disp: , Rfl:      polyethylene glycol (MIRALAX) powder, Take 17 g by mouth daily., Disp: 510 g, Rfl: 3     polyethylene glycol - propylene glycol (SYSTANE) 0.4-0.3 % SOLN, Place 1 drop into both eyes every hour as needed., Disp: , Rfl:      PRESERVISION AREDS PO, Take 1 tab by mouth. Once daily., Disp: , Rfl:      Allergies   Allergen Reactions     Dye [Contrast Dye]      headaches     Apap-Fd&C Yellow #10 [place]-Hydrocodone      Vomiting     Aspirin      Sensitivity, hx of GI bleeding while on IBU     Aspirin Unknown     Clindamycin      \"black stools\", hx of GI bleed while on IBU and Clindamycin     Ibuprofen      Sensitivity, hx of GI bleeding while taking IBU     Levaquin      Sore ankle, tendon     Levofloxacin Swelling     Pain and swelling left foot     No Clinical Screening - See Comments Other (See Comments)     2-in-1 cleanser     Penicillins Hives and Unknown     Tramadol Nausea     Hydrochlorothiazide Rash     Leflunomide Rash     /88  Pulse 72  Ht 1.6 m (5' 3\")  Wt 59.4 kg (130 lb 14.4 oz)  BMI 23.19 kg/m2  She is comfortable, in no distress, non-labored breathing.  Abdomen is soft, non-tender, non-distended.  Normal external female genitalia.  Speculum and bimanual exam are remarkable for atrophic vaginal tissues. Pelvic floor is tender to light palpation. Stage II prolapse with posterior wall at -1.     She was fitted with a #2 ring with support that was too large and caused a small tear in the posterior introitus that required some silver nitrate for hemostasis.  A #1 ring with support however was easier to place and patient tolerated it.    A/P: 77 year old F with stage II uterovaginal prolapse and vaginal atrophy.     At this time discussed with patient leaving the pessary in place and having her return to see Dr Sandoval in 2-4 weeks for follow up to assess how well the pessary is working.      Instructed her on proper use of the vaginal estrogen cream    Discussed that she is a " good candidate for pelvic floor therapy and encouraged her to talk to her daughter more about this.      She will follow up with Dr Sandoval and return to see us as needed    Scribed by Kevin Guerin, MS4, [employer], for Ellyn Witt MD, MPH.    I, Ellyn Witt, agree with above History, Review of Systems, Physical exam and Plan.  I have reviewed the content of the documentation and have edited it as needed. I have personally performed the services documented here and the documentation accurately represents those services and the decisions I have made.      30 minutes were spent with the patient today, > 50% in counseling and coordination of care    Ellyn Witt MD MPH   of Urology    CC  Patient Care Team:  Gaviota Whittaker MD as PCP - Alma Shelton as Referring Physician  Kyle Sandoval MD as MD (OB/Gyn)  SELF, REFERRED

## 2018-08-02 NOTE — PATIENT INSTRUCTIONS
If you pessary falls out don't replace it    Consider pelvic floor physical therapy    Websites with free information:    American Urogynecologic Society patient website: www.voicesforpfd.org    Total Control Program: www.totalcontrolprogram.com    Return to see Dr Ortiz in one month, sooner if needed    It was a pleasure meeting with you today.  Thank you for allowing me and my team the privilege of caring for you today.  YOU are the reason we are here, and I truly hope we provided you with the excellent service you deserve.  Please let us know if there is anything else we can do for you so that we can be sure you are leaving completely satisfied with your care experience.

## 2018-08-07 PROBLEM — N95.2 ATROPHIC VAGINITIS: Status: ACTIVE | Noted: 2018-08-07

## 2018-08-07 PROBLEM — N81.4 UTEROVAGINAL PROLAPSE: Status: ACTIVE | Noted: 2018-08-07

## 2018-08-23 ENCOUNTER — OFFICE VISIT (OUTPATIENT)
Dept: UROLOGY | Facility: CLINIC | Age: 78
End: 2018-08-23
Attending: OBSTETRICS & GYNECOLOGY
Payer: MEDICARE

## 2018-08-23 VITALS
HEART RATE: 74 BPM | SYSTOLIC BLOOD PRESSURE: 155 MMHG | WEIGHT: 132 LBS | BODY MASS INDEX: 23.38 KG/M2 | DIASTOLIC BLOOD PRESSURE: 76 MMHG

## 2018-08-23 DIAGNOSIS — N81.4 UTEROVAGINAL PROLAPSE: Primary | ICD-10-CM

## 2018-08-23 PROCEDURE — G0463 HOSPITAL OUTPT CLINIC VISIT: HCPCS | Mod: ZF

## 2018-08-23 ASSESSMENT — PAIN SCALES - GENERAL: PAINLEVEL: NO PAIN (0)

## 2018-08-23 NOTE — LETTER
8/23/2018       RE: Alejandra Ely  [address] [address] [facility] [address]     Dear Colleague,    Thank you for referring your patient, Alejandra Ely, to the [facility] at [facility]. Please see a copy of my visit note below.    CC: prolapse    Pt here for pessary check. Pt inserting and removing pessary every 3 months. Pt happy with pessary. Denies bleeding, spotting or discharge.    /76  Pulse 74  Wt 59.9 kg (132 lb)  BMI 23.38 kg/m2 No LMP recorded. Patient is postmenopausal. Body mass index is 23.38 kg/(m^2).  Ms. Ely is alert, comfortable in no acute distress, non-labored breathing.   Vagina: is normal without evidence of erosion or d/c    A/P: Alejandra Ely is a 78 year old F with uterovaginal prolapse     F/U every 3 months for pessary removal and cleaning.    A total of 15 minutes were spent with the patient today, > 50% in counseling and coordination of care    Kyle Sandoval MD  Professor, OB/GYN  Urogynecologist  CC  Patient Care Team:  Gaviota Whittaker MD as PCP - General  Alma Brewer as Referring Physician  Kyle Sandoval MD as MD (OB/Gyn)  SELF, REFERRED    Again, thank you for allowing me to participate in the care of your patient.      Sincerely,    Kyle Sandoval MD

## 2018-08-23 NOTE — MR AVS SNAPSHOT
After Visit Summary   8/23/2018    Alejandra Ely    MRN: [id number]           Patient Information     Date Of Birth          1940        Visit Information        Provider Department      8/23/2018 1:00 PM Kyle Sandoval MD [facility]        Today's Diagnoses     Uterovaginal prolapse    -  1       Follow-ups after your visit        Follow-up notes from your care team     Return in about 3 months (around 11/23/2018).      Who to contact     Please call your clinic at [phone] to:    Ask questions about your health    Make or cancel appointments    Discuss your medicines    Learn about your test results    Speak to your doctor            Additional Information About Your Visit        [employer] Information     [employer] gives you secure access to your electronic health record. If you see a primary care provider, you can also send messages to your care team and make appointments. If you have questions, please call your primary care clinic.  If you do not have a primary care provider, please call [phone] and they will assist you.      [employer] is an electronic gateway that provides easy, online access to your medical records. With [employer], you can request a clinic appointment, read your test results, renew a prescription or communicate with your care team.     To access your existing account, please contact your [facility] or call [phone] for assistance.        Care EveryWhere ID     This is your Care EveryWhere ID. This could be used by other organizations to access your [place] medical records  [id number]        Your Vitals Were     Pulse BMI (Body Mass Index)                74 23.38 kg/m2           Blood Pressure from Last 3 Encounters:   08/23/18 155/76   08/02/18 171/88   07/05/18 154/80    Weight from Last 3 Encounters:   08/23/18 59.9 kg (132 lb)   08/02/18 59.4 kg (130 lb 14.4 oz)   07/05/18 60.9 kg (134 lb 4.8 oz)               Today, you had the following     No orders found for display         Today's Medication Changes          These changes are accurate as of 8/23/18  1:36 PM.  If you have any questions, ask your nurse or doctor.               These medicines have changed or have updated prescriptions.        Dose/Directions    omeprazole 40 MG capsule   Commonly known as:  priLOSEC   This may have changed:  how much to take   Used for:  GERD (gastroesophageal reflux disease)        Dose:  40 mg   Take 1 capsule by mouth daily.   Quantity:  90 capsule   Refills:  1                Primary Care Provider Office Phone # Fax #    Gaviota Whittaker [phone]       XXX NO INFO FOUND  [address] [place] [address]        Equal Access to Services     ETHAN [facility] : Hadii loraine Galvez, wavishal crespo, qazee kaalmada charlie, phylicia yeager . So [facility] [phone].    ATENCIÓN: Si habla español, tiene a leonard disposición servicios gratuitos de asistencia lingüística. Llame al [phone].    We comply with applicable federal civil rights laws and [place] laws. We do not discriminate on the basis of race, color, national origin, age, disability, sex, sexual orientation, or gender identity.            Thank you!     Thank you for choosing [facility]  for your care. Our goal is always to provide you with excellent care. Hearing back from our patients is one way we can continue to improve our services. Please take a few minutes to complete the written survey that you may receive in the mail after your visit with us. Thank you!             Your Updated Medication List - Protect others around you: Learn how to safely use, store and throw away your medicines at www.disposemymeds.org.          This list is accurate as of 8/23/18  1:36 PM.  Always use your most recent med list.                   Brand Name Dispense Instructions for use Diagnosis     AVASTIN 100 MG/4ML injection   Generic drug:  BEVACizumab      10 MG/KG EVERY 3 months        calcium carbonate 600 MG tablet   Generic drug:  calcium      Take 1 tablet by mouth 2 times daily.        calcium carbonate-vitamin D 600-200 MG-UNIT Tabs           cyanocobalamin 1000 MCG tablet    vitamin  B-12     Take 1,000 mcg by mouth daily        estradiol 0.1 MG/GM cream    ESTRACE VAGINAL    42.5 g    Place 1 g vaginally twice a week    Rectocele       ferrous sulfate 325 (65 Fe) MG Tbec EC tablet      Take 325 mg by mouth daily        folic acid 1 MG tablet    FOLVITE     1 tablet daily        latanoprost 0.005 % ophthalmic solution    XALATAN     1 drop        loratadine 10 MG capsule      10 mg as needed        methotrexate 2.5 MG tablet CHEMO      17.5 mg once a week        Methylcobalamin 1 MG Chew           MULTI-VITAMINS Tabs      1 tablet daily        omeprazole 40 MG capsule    priLOSEC    90 capsule    Take 1 capsule by mouth daily.    GERD (gastroesophageal reflux disease)       pilocarpine 5 MG tablet    SALAGEN     5 mg 2 times daily        polyethylene glycol 0.4%- propylene glycol 0.3% 0.4-0.3 % Soln ophthalmic solution    SYSTANE ULTRA     Place 1 drop into both eyes every hour as needed.        polyethylene glycol powder    MIRALAX    510 g    Take 17 g by mouth daily.    Constipation       PRESERVISION AREDS PO      Take 1 tab by mouth. Once daily.        TYLENOL 325 MG tablet   Generic drug:  acetaminophen      Take 1-2 tablets by mouth every 6 hours as needed.

## 2018-08-23 NOTE — PROGRESS NOTES
CC: prolapse    Pt here for pessary check. Pt inserting and removing pessary every 3 months. Pt happy with pessary. Denies bleeding, spotting or discharge.    /76  Pulse 74  Wt 59.9 kg (132 lb)  BMI 23.38 kg/m2 No LMP recorded. Patient is postmenopausal. Body mass index is 23.38 kg/(m^2).  Ms. Ely is alert, comfortable in no acute distress, non-labored breathing.   Vagina: is normal without evidence of erosion or d/c    A/P: Alejandra Ely is a 78 year old F with uterovaginal prolapse     F/U every 3 months for pessary removal and cleaning.    A total of 15 minutes were spent with the patient today, > 50% in counseling and coordination of care    Kyle Sandoval MD  Professor, OB/GYN  Urogynecologist  CC  Patient Care Team:  Gaviota Whittaker MD as PCP - Alma Shelton as Referring Physician  Kyle Sandoval MD as MD (OB/Gyn)  SELF, REFERRED

## 2019-11-04 ENCOUNTER — HEALTH MAINTENANCE LETTER (OUTPATIENT)
Age: 79
End: 2019-11-04

## 2020-02-16 ENCOUNTER — HEALTH MAINTENANCE LETTER (OUTPATIENT)
Age: 80
End: 2020-02-16

## 2020-11-22 ENCOUNTER — HEALTH MAINTENANCE LETTER (OUTPATIENT)
Age: 80
End: 2020-11-22

## 2021-02-13 ENCOUNTER — HEALTH MAINTENANCE LETTER (OUTPATIENT)
Age: 81
End: 2021-02-13

## 2021-04-04 ENCOUNTER — HEALTH MAINTENANCE LETTER (OUTPATIENT)
Age: 81
End: 2021-04-04

## 2021-09-18 ENCOUNTER — HEALTH MAINTENANCE LETTER (OUTPATIENT)
Age: 81
End: 2021-09-18

## 2022-03-05 ENCOUNTER — HEALTH MAINTENANCE LETTER (OUTPATIENT)
Age: 82
End: 2022-03-05

## 2022-04-30 ENCOUNTER — HEALTH MAINTENANCE LETTER (OUTPATIENT)
Age: 82
End: 2022-04-30

## 2022-06-28 ENCOUNTER — HOSPITAL ENCOUNTER (OUTPATIENT)
Dept: NUCLEAR MEDICINE | Facility: CLINIC | Age: 82
Setting detail: NUCLEAR MEDICINE
Discharge: HOME | End: 2022-06-28
Attending: PREVENTIVE MEDICINE

## 2022-06-28 DIAGNOSIS — Z00.6 RESEARCH EXAM: ICD-10-CM

## 2022-06-28 PROCEDURE — A9561 TC99M OXIDRONATE: HCPCS

## 2022-06-28 PROCEDURE — 343N000001 HC RX 343

## 2022-06-28 PROCEDURE — 78830 RP LOCLZJ TUM SPECT W/CT 1: CPT | Mod: TC

## 2022-06-28 RX ADMIN — Medication 16.3 MILLICURIE: at 11:20

## 2022-11-20 ENCOUNTER — HEALTH MAINTENANCE LETTER (OUTPATIENT)
Age: 82
End: 2022-11-20

## 2023-04-15 ENCOUNTER — HEALTH MAINTENANCE LETTER (OUTPATIENT)
Age: 83
End: 2023-04-15

## 2023-06-02 ENCOUNTER — HEALTH MAINTENANCE LETTER (OUTPATIENT)
Age: 83
End: 2023-06-02

## 2024-06-22 ENCOUNTER — HEALTH MAINTENANCE LETTER (OUTPATIENT)
Age: 84
End: 2024-06-22

## 2025-03-02 ENCOUNTER — HEALTH MAINTENANCE LETTER (OUTPATIENT)
Age: 85
End: 2025-03-02

## 2025-07-12 ENCOUNTER — HEALTH MAINTENANCE LETTER (OUTPATIENT)
Age: 85
End: 2025-07-12